# Patient Record
Sex: FEMALE | Race: WHITE | NOT HISPANIC OR LATINO
[De-identification: names, ages, dates, MRNs, and addresses within clinical notes are randomized per-mention and may not be internally consistent; named-entity substitution may affect disease eponyms.]

---

## 2017-05-15 PROBLEM — Z00.00 ENCOUNTER FOR PREVENTIVE HEALTH EXAMINATION: Status: ACTIVE | Noted: 2017-05-15

## 2020-04-25 ENCOUNTER — MESSAGE (OUTPATIENT)
Age: 50
End: 2020-04-25

## 2020-05-07 LAB
SARS-COV-2 IGG SERPL IA-ACNC: <0.1 INDEX
SARS-COV-2 IGG SERPL QL IA: NEGATIVE

## 2020-05-13 ENCOUNTER — APPOINTMENT (OUTPATIENT)
Dept: DISASTER EMERGENCY | Facility: HOSPITAL | Age: 50
End: 2020-05-13

## 2021-11-19 ENCOUNTER — EMERGENCY (EMERGENCY)
Facility: HOSPITAL | Age: 51
LOS: 1 days | Discharge: HOME | End: 2021-11-19
Admitting: STUDENT IN AN ORGANIZED HEALTH CARE EDUCATION/TRAINING PROGRAM
Payer: COMMERCIAL

## 2021-11-19 VITALS
OXYGEN SATURATION: 98 % | RESPIRATION RATE: 16 BRPM | TEMPERATURE: 98 F | DIASTOLIC BLOOD PRESSURE: 89 MMHG | HEART RATE: 81 BPM | SYSTOLIC BLOOD PRESSURE: 123 MMHG

## 2021-11-19 VITALS
RESPIRATION RATE: 17 BRPM | SYSTOLIC BLOOD PRESSURE: 141 MMHG | DIASTOLIC BLOOD PRESSURE: 91 MMHG | OXYGEN SATURATION: 100 % | TEMPERATURE: 98 F | HEART RATE: 79 BPM

## 2021-11-19 PROCEDURE — 93971 EXTREMITY STUDY: CPT | Mod: 26,LT

## 2021-11-19 PROCEDURE — 99284 EMERGENCY DEPT VISIT MOD MDM: CPT

## 2021-11-19 RX ORDER — OXYCODONE AND ACETAMINOPHEN 5; 325 MG/1; MG/1
1 TABLET ORAL ONCE
Refills: 0 | Status: DISCONTINUED | OUTPATIENT
Start: 2021-11-19 | End: 2021-11-19

## 2021-11-19 RX ORDER — ACETAMINOPHEN 500 MG
650 TABLET ORAL ONCE
Refills: 0 | Status: COMPLETED | OUTPATIENT
Start: 2021-11-19 | End: 2021-11-19

## 2021-11-19 RX ADMIN — Medication 650 MILLIGRAM(S): at 17:06

## 2021-11-19 RX ADMIN — OXYCODONE AND ACETAMINOPHEN 1 TABLET(S): 5; 325 TABLET ORAL at 17:06

## 2021-11-19 NOTE — ED ADULT TRIAGE NOTE - CHIEF COMPLAINT QUOTE
Pt c/o left leg pain starting 2 weeks ago. Pt states she has been taking muscle relaxers with some relief. Pt states while walking to work yesterday she heard a "pop". Pt unable to bear full weight on leg due to pain. Pt s/o kidney transplant 6 years ago

## 2021-11-19 NOTE — ED PROVIDER NOTE - NSFOLLOWUPINSTRUCTIONS_ED_ALL_ED_FT
Rest, drink plenty of fluids.  Advance activity as tolerated.  Continue all previously prescribed medications as directed.  Follow up with your primary care physician in 48-72 hours- bring copies of your results.  Return to the ER for worsening or persistent symptoms, and/or ANY NEW OR CONCERNING SYMPTOMS. If you have issues obtaining follow up, please call: [phone] to obtain a doctor or specialist who takes your insurance in your area.  You may call [phone] to make an appointment with the internal medicine clinic.

## 2021-11-19 NOTE — ED PROVIDER NOTE - ENMT, MLM
-- DO NOT REPLY / DO NOT REPLY ALL --  -- Message is from the [facility]--    COVID-19 Universal Screening: N/A - Not about scheduling    General Patient Message      Reason for Call: Patient is requesting that provider doesn't for get to call back.     Caller Information       Type Contact Phone    05/12/2021 04:26 PM CDT Phone (Incoming) Julia Gottlieb (Self) [phone] (H)          Alternative phone number: none        Did the caller agree that this message can wait until the office reopens in the morning? YES - The Message Can Wait      Send a message to the provider’s clinical support pool.     Turnaround time given to caller:   \"This message will be sent to [state Provider's name]. The clinical team will fulfill your request as soon as they review your message when the office opens tomorrow.\"         Airway patent, Nasal mucosa clear. Mouth with normal mucosa. Throat has no vesicles, no oropharyngeal exudates and uvula is midline.

## 2021-11-19 NOTE — ED ADULT NURSE NOTE - OBJECTIVE STATEMENT
Pt presents to intake 10b, alert&orientedx4, ambulatory at baseline, PMHX Kidney transplant x6 yrs ago coming to ED for left leg pain x2 weeks. Pt states she heard a "pop", has been having difficulty ambulating and bear weight on that leg. Denies any chest pain or SOB. Denies any falls. No swelling or redness noted to leg. +pulses. Pending US.

## 2021-11-19 NOTE — ED PROVIDER NOTE - OBJECTIVE STATEMENT
52 y/o F w/ PMHx kidney transplant presents to the ED c/o L leg pain for the last 2 weeks. Pt states the pain has been progressively worse and yesterday felt a pop in her calf area. Pt states the pain has been so severe that it hurts when she walks. Pt states she is not sure where the pain comes from but feels it on her calf going up her leg. Pt states she was working a double when the pain got so bad which prompted to call her PMD who gave her muscle relaxers. Denies trauma, falls, fever, chills, body aches, dizziness, headache, numbness or tingling in the legs, denies history of blood clots. States is not on any blood thinners or OCPs.

## 2021-11-19 NOTE — ED PROVIDER NOTE - CLINICAL SUMMARY MEDICAL DECISION MAKING FREE TEXT BOX
52 y/o F presents to the ED w/ leg pain. Will obtain sonogram to r/o DVT. No acute trauma to area and no acute swelling. Low suspicion for fracture so no need for x-ray at this time. Will provide pain control and reassess.

## 2021-11-19 NOTE — ED PROVIDER NOTE - PATIENT PORTAL LINK FT
You can access the FollowMyHealth Patient Portal offered by [facility] by registering at the following website: [email or web address]. By joining [facility]’s FollowMyHealth portal, you will also be able to view your health information using other applications (apps) compatible with our system.

## 2021-11-19 NOTE — ED ADULT NURSE REASSESSMENT NOTE - REASSESS COMMUNICATION
percocet given to patient as instructed to take at home with instructions not to drive or drink alcohol after taking percocet. additional dose of tylenol given here for pain./ED physician notified

## 2021-11-24 ENCOUNTER — APPOINTMENT (OUTPATIENT)
Dept: ORTHOPEDIC SURGERY | Facility: CLINIC | Age: 51
End: 2021-11-24
Payer: COMMERCIAL

## 2021-11-24 ENCOUNTER — NON-APPOINTMENT (OUTPATIENT)
Age: 51
End: 2021-11-24

## 2021-11-24 VITALS
SYSTOLIC BLOOD PRESSURE: 142 MMHG | HEART RATE: 90 BPM | WEIGHT: 140 LBS | DIASTOLIC BLOOD PRESSURE: 88 MMHG | BODY MASS INDEX: 21.97 KG/M2 | HEIGHT: 67 IN | OXYGEN SATURATION: 97 %

## 2021-11-24 PROCEDURE — 99204 OFFICE O/P NEW MOD 45 MIN: CPT | Mod: 25

## 2021-11-24 PROCEDURE — 20611 DRAIN/INJ JOINT/BURSA W/US: CPT | Mod: LT

## 2021-11-29 ENCOUNTER — APPOINTMENT (OUTPATIENT)
Dept: ORTHOPEDIC SURGERY | Facility: CLINIC | Age: 51
End: 2021-11-29
Payer: COMMERCIAL

## 2021-11-29 DIAGNOSIS — M11.20 OTHER CHONDROCALCINOSIS, UNSPECIFIED SITE: ICD-10-CM

## 2021-11-29 PROCEDURE — 99214 OFFICE O/P EST MOD 30 MIN: CPT

## 2021-12-01 ENCOUNTER — APPOINTMENT (OUTPATIENT)
Dept: ORTHOPEDIC SURGERY | Facility: CLINIC | Age: 51
End: 2021-12-01

## 2021-12-01 ENCOUNTER — OUTPATIENT (OUTPATIENT)
Dept: OUTPATIENT SERVICES | Facility: HOSPITAL | Age: 51
LOS: 1 days | End: 2021-12-01
Payer: COMMERCIAL

## 2021-12-01 ENCOUNTER — APPOINTMENT (OUTPATIENT)
Dept: ORTHOPEDIC SURGERY | Facility: CLINIC | Age: 51
End: 2021-12-01
Payer: MEDICARE

## 2021-12-01 ENCOUNTER — APPOINTMENT (OUTPATIENT)
Dept: MRI IMAGING | Facility: IMAGING CENTER | Age: 51
End: 2021-12-01
Payer: COMMERCIAL

## 2021-12-01 DIAGNOSIS — M25.469 EFFUSION, UNSPECIFIED KNEE: ICD-10-CM

## 2021-12-01 PROCEDURE — 73721 MRI JNT OF LWR EXTRE W/O DYE: CPT | Mod: 26,LT

## 2021-12-01 PROCEDURE — 20611 DRAIN/INJ JOINT/BURSA W/US: CPT | Mod: LT

## 2021-12-01 PROCEDURE — 99214 OFFICE O/P EST MOD 30 MIN: CPT | Mod: 25

## 2021-12-01 PROCEDURE — 73721 MRI JNT OF LWR EXTRE W/O DYE: CPT

## 2021-12-02 RX ORDER — CYCLOBENZAPRINE HYDROCHLORIDE 5 MG/1
5 TABLET, FILM COATED ORAL 3 TIMES DAILY
Qty: 21 | Refills: 0 | Status: ACTIVE | COMMUNITY
Start: 2021-12-02 | End: 1900-01-01

## 2021-12-06 RX ORDER — PREDNISONE 20 MG/1
20 TABLET ORAL
Qty: 10 | Refills: 0 | Status: ACTIVE | COMMUNITY
Start: 2021-11-29 | End: 1900-01-01

## 2021-12-07 ENCOUNTER — NON-APPOINTMENT (OUTPATIENT)
Age: 51
End: 2021-12-07

## 2021-12-10 ENCOUNTER — APPOINTMENT (OUTPATIENT)
Dept: ORTHOPEDIC SURGERY | Facility: CLINIC | Age: 51
End: 2021-12-10
Payer: COMMERCIAL

## 2021-12-10 DIAGNOSIS — S83.241A OTHER TEAR OF MEDIAL MENISCUS, CURRENT INJURY, RIGHT KNEE, INITIAL ENCOUNTER: ICD-10-CM

## 2021-12-10 PROCEDURE — 99214 OFFICE O/P EST MOD 30 MIN: CPT

## 2021-12-10 NOTE — DISCUSSION/SUMMARY
[de-identified] : left knee bone contusion vs insufficiency fracture medial tibial plateu\par left knee posterior horn root and undersurface body medial meniscus tear\par \par \par BRA of op vs nonop at length.\par \par exam demonstrates minimal meniscal pain on provocative exam, significantly less than WB\par \par no nasids due to renal transplant, recommend discussion of medication w transplant team\par \par PT\par \par given duration since injury no pwb\par \par recommend osteoporosis workup\par \par fu 8 weeks after PT

## 2021-12-10 NOTE — HISTORY OF PRESENT ILLNESS
[de-identified] : CC LEFT knee\par \par HPI: 50 yo F, presents with acute onset of 6 weeks of activity related medial joint line left knee pain with no current injury, possible remote injury.\par The pain is worse, and rated a 7 out of 10, described as sharp,without radiation.\par Nothing makes the pain better and walking standing makes the pain worse.\par The patient reports associated symptoms of pain. \par The patient endorses pain at night affecting sleep, and denies similar pain previously.\par \par The patient has tried the following treatments:\par Activity modification	-\par Ice/Compression  	-\par Braces    		-\par Nsaids    		- unable to due to Kidney Transplant\par Physical Therapy 	-\par Cortisone Injection	+ 1st shot: 12/1/21, no relief\par Visco Injection		-\par Zilretta			-\par Arthroscopy		-\par \par Pt had knee aspiration on 11/24/21 (not suspicious for gout), 12/1/21 (suspicion for gout)\par Pt has not seen a Rheumatologist\par \par Review of Systems is positive for the above musculoskeletal symptoms and is otherwise non-contributory for general, constitutional, psychiatric, neurologic, HEENT, cardiac, respiratory, gastrointestinal, reproductive, lymphatic, and dermatologic complaints.\par \par Consult by Dr Cordero\par \par  no

## 2021-12-10 NOTE — PHYSICAL EXAM
[de-identified] : Physical Examination\par General: well nourished, in no acute distress, alert and oriented to person, place and time\par Psychiatric: normal mood and affect, no abnormal movements or speech patterns\par Eyes: vision intact [Without] glasses\par \par Musculoskeletal Examination\par Ambulation	+ antalgic gait, [-] assistive devices\par \par Knee			Right			Left\par General\par      Swelling/Deformity	normal			normal	\par      Skin			normal			normal\par      Erythema		-			-\par      Standing Alignment	neutral			neutral\par      Effusion		none			small\par Range of Motion\par      Hip			full painless ROM		full painless ROM\par      Knee Flexion		130			130\par      Knee Extension	0			0\par Patella\par      J Sign		-			-\par      Quad Medial/Lateral	1/1 1/1\par      Apprehension		-			-\par      Richard's		-			-\par      Grind Sign		-			-\par      Crepitus		-			-\par Palpation\par      Medial Joint Line	-			trace\par      Medial Fem Condyle	-			-\par      Lateral Joint Line	-			-\par      Quad Tendon		-			-\par      Patella Tendon	-			-\par      Medial Patella		-			-\par      Lateral Patella 	-			-\par      Posterior Knee	-			-\par Ligamentous\par      Varus @ 0° / 30°	-/-			-/-\par      Valgus @ 0° / 30°	-/-			-/-\par      Lachman		-			-\par      Pivot Shift		-			-\par      Anterior Drawer	-			-\par      Posterior Drawer	-			-\par Meniscus\par      Raj		-			= trace medial pain\par      Flexion Pinch		-			- no posterior pain\par Strength Examination/Atrophy\par      Hip Flexors 		5+			5+\par      Quadriceps		5+			5+\par      Hamstring		5+			5+\par      Tibialis Anterior	5+			5+\par      Achilles/Soleus	5+			5+\par Sensation\par      Deep Peroneal	normal			normal\par      Superficial Peroneal 	normal			normal\par      Sural  		normal			normal\par      Posterior Tibial 	normal			normal\par      Saphneous 		normal			normal\par Pulses\par      DP			2+			2+\par  [de-identified] : 4 views of the affected Left knee (standing AP, flexing standing AP, 30degree flexed lateral, sunrise view)\par 11-24-21 and evaluated by myself today and\par demonstrate:\par There is trace medial wb narrowing\par trace osteophytic lipping\par small suprapatellar effusion\par trace patellofemoral joint space loss without evidence of tilt [or] subluxation on sunrise view\par Normal soft tissue density\par Otherwise normal osseous bone structure without fracture or dislocation\par \par \par \par MRI Left knee [facility] on 12-7-21\par My impression of the images:\par Quality of the MRI is good\par Medial Meniscus post root tear, undersurface posterior body tear, extrusion of the body\par Lateral Meniscus ok\par There is mild chondral loss in the medial compartments\par There is  bone marrow edema in the medial tibial plateau compartments\par LCL [Is] intact\par MCL [Is] intact\par ACL [Is] intact\par PCL [Is] intact \par Quadriceps Tendon [Is] intact\par Patella Tendon [Is] intact\par \par The Final Radiologist Impression:\par Joint space and synovium: There is moderate joint effusion. There is a trace popliteal cyst with partial leak.\par \par Bones and articular cartilage: There are no erosive changes to suggest gout. There is a T2 hyperintense lobulated lesion measuring 1.0 x 0.7 x 0.8 cm in the medial distal femoral condyle, consistent with a low-grade cartilage lesion such as enchondroma. There is patchy bone marrow edema involving the posterior medial tibial plateau without a fracture line consistent with microtrabecular injury. There is a tiny focus of lateral femoral trochlea subchondral edema without a well-defined fissure. There is a partial thickness intermediate grade cartilage fissure at the medial patellar facet without subchondral edema.\par \par Menisci: There is a root tear of the posterior horn of the medial meniscus with anterolisthesis across the meniscal fragments and peripheral meniscal extrusion. Oblique signal abnormality also extends to the posterior horn. Lateral meniscus is intact.\par \par Tendons and Ligaments: There is mucoid degeneration of the anterior cruciate ligament. The posterior cruciate ligament is normal. The medial collateral ligament and lateral collateral ligament complex are intact. At the popliteus tendon there is no evidence of gouty deposition. Extensor mechanism is intact.\par \par \par \par IMPRESSION:\par \par 1. Root tear of the posterior horn of the medial meniscus with peripheral extrusion; longitudinal signal abnormality extends into the posterior horn as well.\par \par 2. Localized bone marrow contusion of the posteromedial tibial plateau, without fracture.\par \par 3. Mucoid degeneration of the anterior cruciate ligament.\par \par 4. Mild cartilage degeneration with tiny focus of lateral femoral trochlea subchondral edema, without a well-defined fissure, and partial thickness intermediate-grade cartilage fissure at the medial patellar facet without subchondral edema.\par \par 5. Moderate knee joint effusion with trace popliteal cyst demonstrating leak.\par \par

## 2021-12-28 ENCOUNTER — APPOINTMENT (OUTPATIENT)
Dept: RHEUMATOLOGY | Facility: CLINIC | Age: 51
End: 2021-12-28

## 2022-01-03 LAB
B PERT IGG+IGM PNL SER: ABNORMAL
B PERT IGG+IGM PNL SER: ABNORMAL
BACTERIA FLD CULT: NORMAL
BACTERIA FLD CULT: NORMAL
COLOR FLD: YELLOW
COLOR FLD: YELLOW
EOSINOPHIL # FLD MANUAL: 0 %
EOSINOPHIL # FLD MANUAL: 0 %
FLUID INTAKE SUBSTANCE CLASS: NORMAL
FLUID INTAKE SUBSTANCE CLASS: NORMAL
LYMPHOCYTES # FLD MANUAL: 27 %
LYMPHOCYTES # FLD MANUAL: 27 %
MESOTHL CELL NFR FLD: 0 %
MESOTHL CELL NFR FLD: 0 %
MONOS+MACROS NFR FLD MANUAL: 34 %
MONOS+MACROS NFR FLD MANUAL: 67 %
NEUTS SEG # FLD MANUAL: 39 %
NEUTS SEG # FLD MANUAL: 6 %
NRBC # FLD: 0
NRBC # FLD: 0
RBC # FLD MANUAL: ABNORMAL /UL
RBC # FLD MANUAL: ABNORMAL /UL
SYCRY CLARITY: ABNORMAL
SYCRY CLARITY: ABNORMAL
SYCRY COLOR: ABNORMAL
SYCRY COLOR: ABNORMAL
SYCRY ID: ABNORMAL
SYCRY ID: ABNORMAL
SYCRY TUBE: NORMAL
SYCRY TUBE: NORMAL
TOTAL CELLS COUNTED FLD: 41 /UL
TOTAL CELLS COUNTED FLD: 97 /UL
TUBE TYPE: NORMAL
TUBE TYPE: NORMAL
UNIDENT CELLS NFR FLD MANUAL: 0 %
UNIDENT CELLS NFR FLD MANUAL: 0 %
VARIANT LYMPHS # FLD MANUAL: 0 %
VARIANT LYMPHS # FLD MANUAL: 0 %

## 2023-06-21 ENCOUNTER — APPOINTMENT (OUTPATIENT)
Dept: ORTHOPEDIC SURGERY | Facility: CLINIC | Age: 53
End: 2023-06-21
Payer: COMMERCIAL

## 2023-06-21 DIAGNOSIS — M25.469 EFFUSION, UNSPECIFIED KNEE: ICD-10-CM

## 2023-06-21 PROCEDURE — 99214 OFFICE O/P EST MOD 30 MIN: CPT | Mod: 25

## 2023-06-21 PROCEDURE — 20611 DRAIN/INJ JOINT/BURSA W/US: CPT | Mod: LT

## 2023-06-23 PROBLEM — M25.469 KNEE EFFUSION: Status: ACTIVE | Noted: 2021-11-25
